# Patient Record
Sex: MALE | Race: WHITE | Employment: UNEMPLOYED | URBAN - METROPOLITAN AREA
[De-identification: names, ages, dates, MRNs, and addresses within clinical notes are randomized per-mention and may not be internally consistent; named-entity substitution may affect disease eponyms.]

---

## 2017-01-08 DIAGNOSIS — K58.0 IRRITABLE BOWEL SYNDROME WITH DIARRHEA: ICD-10-CM

## 2017-01-09 RX ORDER — DICYCLOMINE HYDROCHLORIDE 20 MG/1
TABLET ORAL
Qty: 60 TAB | Refills: 0 | Status: SHIPPED | OUTPATIENT
Start: 2017-01-09 | End: 2017-02-10 | Stop reason: SDUPTHER

## 2017-02-10 DIAGNOSIS — K58.0 IRRITABLE BOWEL SYNDROME WITH DIARRHEA: ICD-10-CM

## 2017-02-12 RX ORDER — DICYCLOMINE HYDROCHLORIDE 20 MG/1
TABLET ORAL
Qty: 60 TAB | Refills: 0 | Status: SHIPPED | OUTPATIENT
Start: 2017-02-12 | End: 2017-03-12 | Stop reason: SDUPTHER

## 2017-02-16 ENCOUNTER — OFFICE VISIT (OUTPATIENT)
Dept: FAMILY MEDICINE CLINIC | Age: 47
End: 2017-02-16

## 2017-02-16 VITALS
DIASTOLIC BLOOD PRESSURE: 82 MMHG | HEIGHT: 67 IN | OXYGEN SATURATION: 98 % | RESPIRATION RATE: 15 BRPM | SYSTOLIC BLOOD PRESSURE: 133 MMHG | WEIGHT: 189.4 LBS | HEART RATE: 80 BPM | TEMPERATURE: 98.4 F | BODY MASS INDEX: 29.73 KG/M2

## 2017-02-16 DIAGNOSIS — F41.9 ANXIETY: ICD-10-CM

## 2017-02-16 DIAGNOSIS — K42.9 UMBILICAL HERNIA WITHOUT OBSTRUCTION AND WITHOUT GANGRENE: ICD-10-CM

## 2017-02-16 DIAGNOSIS — L23.9 ALLERGIC CONTACT DERMATITIS, UNSPECIFIED TRIGGER: Primary | ICD-10-CM

## 2017-02-16 DIAGNOSIS — B07.9 VIRAL WARTS, UNSPECIFIED TYPE: ICD-10-CM

## 2017-02-16 DIAGNOSIS — K58.0 IRRITABLE BOWEL SYNDROME WITH DIARRHEA: ICD-10-CM

## 2017-02-16 RX ORDER — HYDROXYZINE HYDROCHLORIDE 10 MG/1
10 TABLET, FILM COATED ORAL
Qty: 60 TAB | Refills: 0 | Status: SHIPPED | OUTPATIENT
Start: 2017-02-16 | End: 2017-02-26

## 2017-02-16 RX ORDER — ESCITALOPRAM OXALATE 10 MG/1
10 TABLET ORAL DAILY
Qty: 30 TAB | Refills: 3 | Status: SHIPPED | OUTPATIENT
Start: 2017-02-16 | End: 2017-03-30 | Stop reason: SDUPTHER

## 2017-02-16 RX ORDER — TRIAMCINOLONE ACETONIDE 1 MG/G
CREAM TOPICAL 2 TIMES DAILY
Qty: 45 G | Refills: 3 | Status: SHIPPED | OUTPATIENT
Start: 2017-02-16

## 2017-02-16 NOTE — PROGRESS NOTES
George Holliday is a 40 y.o.  male and presents with     Chief Complaint   Patient presents with    Rash    Warts    Umbilical Hernia    Anxiety     Pt complains of rash on his lower abdomen on upper thigh region on both sides. Pt is not sure if it is allergies. He has applying some kind of lotion he is not sure if it is making it worse. Pt does have umbilical hernia that hurts. Pt also has lot of anxiety. Pt does have IBS and loose BM's for which he is on Viberzi. Pt has lesion on his rt palm that bleeds off and on and is thick  at [employer]      Past Medical History   Diagnosis Date    Asthma     Other ill-defined conditions(799.89)      Internal Hemorrhoids    Seizures ([address]      Past Surgical History   Procedure Laterality Date    Hx abdominal laparoscopy      Hx appendectomy      Colonoscopy N/A 7/21/2016     COLONOSCOPY performed by Adrian Tam MD at [facility] ENDOSCOPY     Current Outpatient Prescriptions   Medication Sig    hydrOXYzine HCl (ATARAX) 10 mg tablet Take 1 Tab by mouth three (3) times daily as needed for Itching for up to 10 days.  triamcinolone acetonide (KENALOG) 0.1 % topical cream Apply  to affected area two (2) times a day. use thin layer    escitalopram oxalate (LEXAPRO) 10 mg tablet Take 1 Tab by mouth daily.  dicyclomine (BENTYL) 20 mg tablet TAKE 1 TABLET BY MOUTH TWICE DAILY    eluxadoline (VIBERZI) 75 mg tablet Take 75 mg by mouth two (2) times daily (with meals).  albuterol (PROVENTIL HFA, VENTOLIN HFA, PROAIR HFA) 90 mcg/actuation inhaler Take 2 Puffs by inhalation every six (6) hours as needed for Wheezing.  meloxicam (MOBIC) 15 mg tablet Take 15 mg by mouth daily.  cyclobenzaprine (FLEXERIL) 5 mg tablet Take 5 mg by mouth as needed. No current facility-administered medications for this visit.       Health Maintenance   Topic Date Due    Pneumococcal 19-64 Medium Risk (1 of 1 - PPSV23) 09/30/1990    DTaP/Tdap/Td series (2 - Td) 05/03/2026    INFLUENZA AGE 9 TO ADULT  Addressed     Immunization History   Administered Date(s) Administered    Influenza Vaccine (Quad) PF 10/04/2016    Tdap 05/03/2016     No LMP for male patient. Allergies and Intolerances: Allergies   Allergen Reactions    Tizanidine Other (comments)     Felt like he couldn't swallow       Family History:   Family History   Problem Relation Age of Onset    Emphysema Mother     Depression Father     Sleep Apnea Father        Social History:   He  reports that he quit smoking about 7 years ago. His smoking use included Cigarettes. He has a 33.00 pack-year smoking history.  He uses smokeless tobacco.  He  reports that he drinks about 8.4 oz of alcohol per week             Review of Systems:   General: negative for - chills, fatigue, fever, weight change  Psych: positive for - anxiety,  ENT: negative for - headaches, hearing change, nasal congestion, oral lesions, sneezing or sore throat  Heme/ Lymph: negative for - bleeding problems, bruising, pallor or swollen lymph nodes  Endo: negative for - hot flashes, polydipsia/polyuria or temperature intolerance  Resp: negative for - cough, shortness of breath or wheezing  CV: negative for - chest pain, edema or palpitations  GI: negative for - abdominal pain, change in bowel habits, constipation, diarrhea or nausea/vomiting,pos for painfull umbilical hernia   : negative for - dysuria, hematuria, incontinence, pelvic pain or vulvar/vaginal symptoms  MSK: negative for - joint pain, joint swelling or muscle pain  Neuro: negative for - confusion, headaches, seizures or weakness  Derm: negative for - dry skin, hair changes, rash or skin lesion changes,pos for rash on lower abdomen and thighs          Physical:   Vitals:   Vitals:    02/16/17 0905   BP: 133/82   Pulse: 80   Resp: 15   Temp: 98.4 °F (36.9 °C)   TempSrc: Oral   SpO2: 98%   Weight: 189 lb 6.4 oz (85.9 kg)   Height: 5' 7\" (1.702 m)           Exam:   HEENT- atraumatic,normocephalic, awake, oriented, well nourished  Neck - supple,no enlarged lymph nodes, no JVD, no thyromegaly  Chest- CTA, no rhonchi, no crackles  Heart- rrr, no murmurs / gallop/rub  Abdomen- soft,BS+,NT, no hepatosplenomegaly, mild rash on lower abdomen, umbilical hernia , mild tenderness on direct pressure  Ext - no c/c/edema , mild erythematous  Rash on upper thigh both sides. ,small superifical ulcer on the paml of rt hand with induration at the edges  Neuro- no focal deficits. Power 5/5 all extremities  Skin - warm,dry, no obvious rashes. Review of Data:   LABS:   Lab Results   Component Value Date/Time    WBC 8.5 08/28/2016 11:35 AM    HGB 15.4 08/28/2016 11:35 AM    HCT 44.3 08/28/2016 11:35 AM    PLATELET 267 57/75/6932 11:35 AM     Lab Results   Component Value Date/Time    Sodium 134 08/30/2016 09:46 AM    Potassium 3.6 08/30/2016 09:46 AM    Chloride 93 08/30/2016 09:46 AM    CO2 25 08/30/2016 09:46 AM    Glucose 103 08/30/2016 09:46 AM    BUN 7 08/30/2016 09:46 AM    Creatinine 0.9 08/30/2016 09:46 AM     Lab Results   Component Value Date/Time    Cholesterol, total 199 07/11/2016 02:43 PM    HDL Cholesterol 41 07/11/2016 02:43 PM    LDL, calculated 119 07/11/2016 02:43 PM    Triglyceride 198 07/11/2016 02:43 PM     No results found for: GPT        Impression / Plan:        ICD-10-CM ICD-9-CM    1. Allergic contact dermatitis, unspecified trigger L23.9 692.9 hydrOXYzine HCl (ATARAX) 10 mg tablet      triamcinolone acetonide (KENALOG) 0.1 % topical cream   2. Viral warts, unspecified type B07.9 078.10 REFERRAL TO DERMATOLOGY   3. Umbilical hernia without obstruction and without gangrene K42.9 553.1 REFERRAL TO GENERAL SURGERY   4. Irritable bowel syndrome with diarrhea K58.0 564.1    5. Anxiety F41.9 300.00 escitalopram oxalate (LEXAPRO) 10 mg tablet     Avoid applying any lotion to the abdomen and thighs    Explained to patient risk benefits of the medications. Advised patient to stop meds if having any side effects. Pt verbalized understanding of the instructions. I have discussed the diagnosis with the patient and the intended plan as seen in the above orders. The patient has received an after-visit summary and questions were answered concerning future plans. I have discussed medication side effects and warnings with the patient as well. I have reviewed the plan of care with the patient, accepted their input and they are in agreement with the treatment goals. Reviewed plan of care. Patient has provided input and agrees with goals. Follow-up Disposition:  Return in about 6 weeks (around 3/30/2017).     Demario Rivas MD

## 2017-02-16 NOTE — PROGRESS NOTES
1. Have you been to the ER, urgent care clinic since your last visit? Hospitalized since your last visit? No    2. Have you seen or consulted any other health care providers outside of the [address] [place] [address] since your last visit? Include any pap smears or colon screening. Dr Orellana Class, neuro. Dr. Lynnette Vega- pain management    Patient has rash on left side of stomach and inside of legs for about a month.

## 2017-02-16 NOTE — MR AVS SNAPSHOT
Visit Information Date & Time Provider Department Dept. Phone Encounter #  
 2/16/2017  8:30 AM Skye Marquez[address] [phone] [id number] Follow-up Instructions Return in about 6 weeks (around 3/30/2017). Upcoming Health Maintenance Date Due Pneumococcal 19-64 Medium Risk (1 of 1 - PPSV23) 9/30/1990 DTaP/Tdap/Td series (2 - Td) 5/3/2026 Allergies as of 2/16/2017  Review Complete On: 2/16/2017 By: Cristiane Carlson LPN Severity Noted Reaction Type Reactions Tizanidine  05/03/2016    Other (comments) Felt like he couldn't swallow Current Immunizations  Never Reviewed Name Date Influenza Vaccine (Quad) PF 10/4/2016 10:50 AM  
 Tdap 5/3/2016 Not reviewed this visit You Were Diagnosed With   
  
 Codes Comments Allergic contact dermatitis, unspecified trigger    -  Primary ICD-10-CM: L23.9 ICD-9-CM: 692.9 Viral warts, unspecified type     ICD-10-CM: B07.9 ICD-9-CM: 078.10 Umbilical hernia without obstruction and without gangrene     ICD-10-CM: K42.9 ICD-9-CM: 553.1 Irritable bowel syndrome with diarrhea     ICD-10-CM: K58.0 ICD-9-CM: 843.9 Anxiety     ICD-10-CM: F41.9 ICD-9-CM: 300.00 Vitals BP Pulse Temp Resp Height(growth percentile) Weight(growth percentile) 133/82 (BP 1 Location: Left arm, BP Patient Position: Sitting) 80 98.4 °F (36.9 °C) (Oral) 15 5' 7\" (1.702 m) 189 lb 6.4 oz (85.9 kg) SpO2 BMI Smoking Status 98% 29.66 kg/m2 Former Smoker Vitals History BMI and BSA Data Body Mass Index Body Surface Area  
 29.66 kg/m 2 2.02 m 2 Preferred Pharmacy Pharmacy Name Phone [facility] [address] [phone] Your Updated Medication List  
  
   
This list is accurate as of: 2/16/17  9:50 AM.  Always use your most recent med list.  
  
  
  
  
 albuterol 90 mcg/actuation inhaler Commonly known as:  PROVENTIL HFA, VENTOLIN HFA, PROAIR HFA Take 2 Puffs by inhalation every six (6) hours as needed for Wheezing. cyclobenzaprine 5 mg tablet Commonly known as:  FLEXERIL Take 5 mg by mouth as needed. dicyclomine 20 mg tablet Commonly known as:  BENTYL TAKE 1 TABLET BY MOUTH TWICE DAILY  
  
 escitalopram oxalate 10 mg tablet Commonly known as:  Shan Barrs Take 1 Tab by mouth daily. hydrOXYzine HCl 10 mg tablet Commonly known as:  ATARAX Take 1 Tab by mouth three (3) times daily as needed for Itching for up to 10 days. meloxicam 15 mg tablet Commonly known as:  MOBIC Take 15 mg by mouth daily. triamcinolone acetonide 0.1 % topical cream  
Commonly known as:  KENALOG Apply  to affected area two (2) times a day. use thin layer VIBERZI 75 mg tablet Generic drug:  eluxadoline Take 75 mg by mouth two (2) times daily (with meals). Prescriptions Sent to Pharmacy Refills  
 hydrOXYzine HCl (ATARAX) 10 mg tablet 0 Sig: Take 1 Tab by mouth three (3) times daily as needed for Itching for up to 10 days. Class: Normal  
 Pharmacy: [address] Ph #: [phone] Route: Oral  
 triamcinolone acetonide (KENALOG) 0.1 % topical cream 3 Sig: Apply  to affected area two (2) times a day. use thin layer Class: Normal  
 Pharmacy: [address] Ph #: [phone] Route: Topical  
 escitalopram oxalate (LEXAPRO) 10 mg tablet 3 Sig: Take 1 Tab by mouth daily. Class: Normal  
 Pharmacy: [address] Ph #: [phone] Route: Oral  
  
We Performed the Following REFERRAL TO DERMATOLOGY [REF19 Custom] REFERRAL TO GENERAL SURGERY [REF27 Custom] Follow-up Instructions Return in about 6 weeks (around 3/30/2017). Referral Information Referral ID Referred By Referred To  
  
 [id number] Vivienne PARNELL Not Available Visits Status Start Date End Date 1 New Request 2/16/17 2/16/18 If your referral has a status of pending review or denied, additional information will be sent to support the outcome of this decision. Referral ID Referred By Referred To  
 [id number] [facility], [address] [place] [address] Ann-Marie Joyner MD  
   [id number] [place] [address] Phone: [phone] Fax: [phone] Visits Status Start Date End Date 1 New Request 2/16/17 2/16/18 If your referral has a status of pending review or denied, additional information will be sent to support the outcome of this decision. Introducing [facility]! Dear Parker Mei: Thank you for requesting a [employer] account. Our records indicate that you already have an active [employer] account. You can access your account anytime at [email or web address] Did you know that you can access your hospital and ER discharge instructions at any time in [employer]? You can also review all of your test results from your hospital stay or ER visit. Additional Information If you have questions, please visit the Frequently Asked Questions section of the [employer] website at [email or web address] Remember, [employer] is NOT to be used for urgent needs. For medical emergencies, dial 911. Now available from your iPhone and Android! Please provide this summary of care documentation to your next provider. Your primary care clinician is listed as Tanesha Cox. If you have any questions after today's visit, please call [phone].

## 2017-02-17 ENCOUNTER — TELEPHONE (OUTPATIENT)
Dept: FAMILY MEDICINE CLINIC | Age: 47
End: 2017-02-17

## 2017-02-17 NOTE — TELEPHONE ENCOUNTER
Called patient and advised them that interactions that could cause stomach bleed between the two medications should be reduced as long as they have been taken with food and that PCP would have brought it to patient's attention when prescribing lexapro. Advised patient that PCP was currently out of office and that he would be notified of his concern and followed up with by Monday if there are any concerns regarding these medications. Patient verbalized understanding.

## 2017-02-17 NOTE — TELEPHONE ENCOUNTER
Pt called stating that Dr. Makayla Matute recently prescribed him Lexapro. He said when he went to the pharmacy to pick it up the pharmacist told him that he has a higher chance of getting a stomach bleed when on both Lexapro and Meloxican. He would like to know if he should stop taking one.  Please assist.

## 2017-03-09 ENCOUNTER — OFFICE VISIT (OUTPATIENT)
Dept: SURGERY | Age: 47
End: 2017-03-09

## 2017-03-09 VITALS
DIASTOLIC BLOOD PRESSURE: 84 MMHG | WEIGHT: 189 LBS | RESPIRATION RATE: 20 BRPM | SYSTOLIC BLOOD PRESSURE: 136 MMHG | HEART RATE: 121 BPM | HEIGHT: 67 IN | BODY MASS INDEX: 29.66 KG/M2 | TEMPERATURE: 97.5 F

## 2017-03-09 DIAGNOSIS — K42.9 UMBILICAL HERNIA WITHOUT OBSTRUCTION AND WITHOUT GANGRENE: Primary | ICD-10-CM

## 2017-03-09 RX ORDER — ACETAMINOPHEN AND CODEINE PHOSPHATE 300; 60 MG/1; MG/1
1 TABLET ORAL
COMMUNITY

## 2017-03-09 NOTE — COMMUNICATION BODY
Hernia Evaluation      Subjective:     Rach Cobian is a 40 y.o. male with a history of umbilical bulging. He complains of increasing size and soreness with straining. The symptoms were first noticed 2 months ago. He has had no nausea and no vomiting. He denies chronic coughing, constipation but admits to recent 25 lb weight gain. Patient Active Problem List    Diagnosis Date Noted    Irritable bowel syndrome with diarrhea 02/16/2017    Seizures ([address] 10/04/2016    Bilateral back pain 10/04/2016    Elevated LFTs 10/04/2016    Splenomegaly 10/04/2016    Irritable bowel syndrome with constipation 10/04/2016     Past Medical History:   Diagnosis Date    Asthma     Other ill-defined conditions(799.89)     Internal Hemorrhoids    Seizures ([address]       Past Surgical History:   Procedure Laterality Date    COLONOSCOPY N/A 7/21/2016    COLONOSCOPY performed by Darren Dodson MD at [facility] ENDOSCOPY    HX ABDOMINAL LAPAROSCOPY      HX APPENDECTOMY      HX HERNIA REPAIR Left       Social History   Substance Use Topics    Smoking status: Former Smoker     Packs/day: 1.50     Years: 22.00     Types: Cigarettes     Quit date: 7/19/2009    Smokeless tobacco: Current User    Alcohol use 8.4 oz/week     14 Cans of beer per week      Family History   Problem Relation Age of Onset    Emphysema Mother     Depression Father     Sleep Apnea Father       Current Outpatient Prescriptions   Medication Sig    acetaminophen-codeine (TYLENOL #4) 300-60 mg per tablet Take 1 Tab by mouth every four (4) hours as needed for Pain.  triamcinolone acetonide (KENALOG) 0.1 % topical cream Apply  to affected area two (2) times a day. use thin layer    escitalopram oxalate (LEXAPRO) 10 mg tablet Take 1 Tab by mouth daily.  dicyclomine (BENTYL) 20 mg tablet TAKE 1 TABLET BY MOUTH TWICE DAILY    eluxadoline (VIBERZI) 75 mg tablet Take 75 mg by mouth two (2) times daily (with meals).     albuterol (PROVENTIL HFA, VENTOLIN HFA, PROAIR HFA) 90 mcg/actuation inhaler Take 2 Puffs by inhalation every six (6) hours as needed for Wheezing.  meloxicam (MOBIC) 15 mg tablet Take 15 mg by mouth daily.  cyclobenzaprine (FLEXERIL) 5 mg tablet Take 5 mg by mouth as needed. No current facility-administered medications for this visit.        Allergies   Allergen Reactions    Tizanidine Other (comments)     Felt like he couldn't swallow        Review of Systems:  Positive in BOLD    CONST: Fever, weight loss, fatigue or chills  GI: Nausea, vomiting, abdominal pain, change in bowel habits, hematochezia, melena, and GERD   INTEG: Dermatitis, abnormal moles  HEENT: Recent changes in vision, vertigo, epistaxis, dysphagia and hoarseness  CV: Chest pain, palpitations, HTN, edema and varicosities  RESP: Cough, shortness of breath, wheezing, hemoptysis, snoring and reactive airway disease  : Hematuria, dysuria, frequency, urgency, nocturia and stress urinary incontinence   MS: Weakness, joint pain and arthritis  ENDO: Diabetes, thyroid disease, polyuria, polydipsia, polyphagia, poor wound healing, heat intolerance, cold intolerance  LYMPH/HEME: Anemia, bruising and history of blood transfusions  NEURO: Dizziness, headache, fainting, seizures and stroke  PSYCH: Anxiety and depression    Objective:     Visit Vitals    /84 (BP 1 Location: Left arm, BP Patient Position: At rest)    Pulse (!) 121    Temp 97.5 °F (36.4 °C) (Oral)    Resp 20    Ht 5' 7\" (1.702 m)    Wt 85.7 kg (189 lb)    BMI 29.6 kg/m2       Physical Exam:      GENERAL: alert, cooperative, no distress, appears stated age  EYE:conjunctivae and sclerae normal, pupils equal, round, reactive to light, extraocular movements intact without nystagmus  THROAT & NECK: no erythema or exudates noted and neck supple and symmetrical; no palpable masses  LUNG: clear to auscultation bilaterally  HEART: Regular rate and rhythm  ABDOMEN:abdomen is soft without significant tenderness, masses, organomegaly or guarding, large manually reduceable umbilical hernia with 2 cm defect  EXTREMITIES:  extremities normal, atraumatic, no cyanosis or edema  SKIN: Normal.      Assessment:   Umbilical hernia  Patient has significant symptoms and wishes to proceed with surgical intervention. Plan:   I explained the indications for open umbilical hernia repair as well as the alternatives. I discussed the potential risks, benefits, and likely outcomes of this course of care, including but not limited to bleeding, wound infection, need for reoperation, injury to surrounding structures, hernia recurrence, mesh infection, failure to improve or even worsen his pain, anesthetic risks and imponderables to include death. The patient indicates understanding of the risks and wishes to proceed.       Signed By: Ann-Marie Joyner MD     March 9, 2017

## 2017-03-09 NOTE — PROGRESS NOTES
Hernia Evaluation      Subjective:     Joe Caballero is a 40 y.o. male with a history of umbilical bulging. He complains of increasing size and soreness with straining. The symptoms were first noticed 2 months ago. He has had no nausea and no vomiting. He denies chronic coughing, constipation but admits to recent 25 lb weight gain. Patient Active Problem List    Diagnosis Date Noted    Irritable bowel syndrome with diarrhea 02/16/2017    Seizures ([address] 10/04/2016    Bilateral back pain 10/04/2016    Elevated LFTs 10/04/2016    Splenomegaly 10/04/2016    Irritable bowel syndrome with constipation 10/04/2016     Past Medical History:   Diagnosis Date    Asthma     Other ill-defined conditions(799.89)     Internal Hemorrhoids    Seizures ([address]       Past Surgical History:   Procedure Laterality Date    COLONOSCOPY N/A 7/21/2016    COL[facility]COPY performed [facility] Walt [facility] MD at [facility] ENDOSCOPY    HX ABDOMINAL LAPAROSCOPY      HX APPENDECTOMY      HX HERNIA REPAIR Left       Social History   Substance Use Topics    Smoking status: Former Smoker     Packs/day: 1.50     Years: 22.00     Types: Cigarettes     Quit date: 7/19/2009    Smokeless tobacco: Current User    Alcohol use 8.4 oz/week     14 Cans of beer per week      Family History   Problem Relation Age of Onset    Emphysema Mother     Depression Father     Sleep Apnea Father       Current Outpatient Prescriptions   Medication Sig    acetaminophen-codeine (TYLENOL #4) 300-60 mg per tablet Take 1 Tab by mouth every four (4) hours as needed for Pain.  triamcinolone acetonide (KENALOG) 0.1 % topical cream Apply  to affected area two (2) times a day. use thin layer    escitalopram oxalate (LEXAPRO) 10 mg tablet Take 1 Tab by mouth daily.  dicyclomine (BENTYL) 20 mg tablet TAKE 1 TABLET BY MOUTH TWICE DAILY    eluxadoline (VIBERZI) 75 mg tablet Take 75 mg by mouth two (2) times daily (with meals).     albuterol (PROVENTIL HFA, VENTOLIN HFA, PROAIR HFA) 90 mcg/actuation inhaler Take 2 Puffs by inhalation every six (6) hours as needed for Wheezing.  meloxicam (MOBIC) 15 mg tablet Take 15 mg by mouth daily.  cyclobenzaprine (FLEXERIL) 5 mg tablet Take 5 mg by mouth as needed. No current facility-administered medications for this visit.        Allergies   Allergen Reactions    Tizanidine Other (comments)     Felt like he couldn't swallow        Review of Systems:  Positive in BOLD    CONST: Fever, weight loss, fatigue or chills  GI: Nausea, vomiting, abdominal pain, change in bowel habits, hematochezia, melena, and GERD   INTEG: Dermatitis, abnormal moles  HEENT: Recent changes in vision, vertigo, epistaxis, dysphagia and hoarseness  CV: Chest pain, palpitations, HTN, edema and varicosities  RESP: Cough, shortness of breath, wheezing, hemoptysis, snoring and reactive airway disease  : Hematuria, dysuria, frequency, urgency, nocturia and stress urinary incontinence   MS: Weakness, joint pain and arthritis  ENDO: Diabetes, thyroid disease, polyuria, polydipsia, polyphagia, poor wound healing, heat intolerance, cold intolerance  LYMPH/HEME: Anemia, bruising and history of blood transfusions  NEURO: Dizziness, headache, fainting, seizures and stroke  PSYCH: Anxiety and depression    Objective:     Visit Vitals    /84 (BP 1 Location: Left arm, BP Patient Position: At rest)    Pulse (!) 121    Temp 97.5 °F (36.4 °C) (Oral)    Resp 20    Ht 5' 7\" (1.702 m)    Wt 85.7 kg (189 lb)    BMI 29.6 kg/m2       Physical Exam:      GENERAL: alert, cooperative, no distress, appears stated age  EYE:conjunctivae and sclerae normal, pupils equal, round, reactive to light, extraocular movements intact without nystagmus  THROAT & NECK: no erythema or exudates noted and neck supple and symmetrical; no palpable masses  LUNG: clear to auscultation bilaterally  HEART: Regular rate and rhythm  ABDOMEN:abdomen is soft without significant tenderness, masses, organomegaly or guarding, large manually reduceable umbilical hernia with 2 cm defect  EXTREMITIES:  extremities normal, atraumatic, no cyanosis or edema  SKIN: Normal.      Assessment:   Umbilical hernia  Patient has significant symptoms and wishes to proceed with surgical intervention. Plan:   I explained the indications for open umbilical hernia repair as well as the alternatives. I discussed the potential risks, benefits, and likely outcomes of this course of care, including but not limited to bleeding, wound infection, need for reoperation, injury to surrounding structures, hernia recurrence, mesh infection, failure to improve or even worsen his pain, anesthetic risks and imponderables to include death. The patient indicates understanding of the risks and wishes to proceed.       Signed By: Ebony Nam MD     March 9, 2017

## 2017-03-09 NOTE — LETTER
3/9/2017 2:28 PM 
 
Patient:  Sandra Grace YOB: 1970 Date of Visit: 3/9/2017 Micaela Beal MD 
[id number] [place] [id number] VIA In Basket Dear Micaela Beal MD, Thank you for referring Mr. Skyla Durbin to [place] [address] for evaluation and treatment. Below are the relevant portions of my assessment and plan of care. Hernia Evaluation Subjective:  
 
Rach Cobian is a 40 y.o. male with a history of umbilical bulging. He complains of increasing size and soreness with straining. The symptoms were first noticed 2 months ago. He has had no nausea and no vomiting. He denies chronic coughing, constipation but admits to recent 25 lb weight gain. Patient Active Problem List  
 Diagnosis Date Noted  Irritable bowel syndrome with diarrhea 02/16/2017  Seizures ([address] 10/04/2016  Bilateral back pain 10/04/2016  Elevated LFTs 10/04/2016  Splenomegaly 10/04/2016  Irritable bowel syndrome with constipation 10/04/2016 Past Medical History:  
Diagnosis Date  Asthma  Other ill-defined conditions(799.89) Internal Hemorrhoids  Seizures ([address] Past Surgical History:  
Procedure Laterality Date  COLONOSCOPY N/A 7/21/2016 COLONOSCOPY performed by Darren Dodson MD at [facility] ENDOSCOPY  
 HX ABDOMINAL LAPAROSCOPY    
 HX APPENDECTOMY  HX HERNIA REPAIR Left Social History Substance Use Topics  Smoking status: Former Smoker Packs/day: 1.50 Years: 22.00 Types: Cigarettes Quit date: 7/19/2009  Smokeless tobacco: Current User  Alcohol use 8.4 oz/week 14 Cans of beer per week Family History Problem Relation Age of Onset  Emphysema Mother  Depression Father  Sleep Apnea Father Current Outpatient Prescriptions Medication Sig  
 acetaminophen-codeine (TYLENOL #4) 300-60 mg per tablet Take 1 Tab by mouth every four (4) hours as needed for Pain.  triamcinolone acetonide (KENALOG) 0.1 % topical cream Apply  to affected area two (2) times a day. use thin layer  escitalopram oxalate (LEXAPRO) 10 mg tablet Take 1 Tab by mouth daily.  dicyclomine (BENTYL) 20 mg tablet TAKE 1 TABLET BY MOUTH TWICE DAILY  eluxadoline (VIBERZI) 75 mg tablet Take 75 mg by mouth two (2) times daily (with meals).  albuterol (PROVENTIL HFA, VENTOLIN HFA, PROAIR HFA) 90 mcg/actuation inhaler Take 2 Puffs by inhalation every six (6) hours as needed for Wheezing.  meloxicam (MOBIC) 15 mg tablet Take 15 mg by mouth daily.  cyclobenzaprine (FLEXERIL) 5 mg tablet Take 5 mg by mouth as needed. No current facility-administered medications for this visit. Allergies Allergen Reactions  Tizanidine Other (comments) Felt like he couldn't swallow Review of Systems:  Positive in BOLD 
 
CONST: Fever, weight loss, fatigue or chills GI: Nausea, vomiting, abdominal pain, change in bowel habits, hematochezia, melena, and GERD INTEG: Dermatitis, abnormal moles HEENT: Recent changes in vision, vertigo, epistaxis, dysphagia and hoarseness CV: Chest pain, palpitations, HTN, edema and varicosities RESP: Cough, shortness of breath, wheezing, hemoptysis, snoring and reactive airway disease : Hematuria, dysuria, frequency, urgency, nocturia and stress urinary incontinence MS: Weakness, joint pain and arthritis ENDO: Diabetes, thyroid disease, polyuria, polydipsia, polyphagia, poor wound healing, heat intolerance, cold intolerance LYMPH/HEME: Anemia, bruising and history of blood transfusions NEURO: Dizziness, headache, fainting, seizures and stroke PSYCH: Anxiety and depression Objective:  
 
Visit Vitals  /84 (BP 1 Location: Left arm, BP Patient Position: At rest)  Pulse (!) 121  Temp 97.5 °F (36.4 °C) (Oral)  Resp 20  
 Ht 5' 7\" (1.702 m)  Wt 85.7 kg (189 lb)  BMI 29.6 kg/m2 Physical Exam:   
 
GENERAL: alert, cooperative, no distress, appears stated age EYE:conjunctivae and sclerae normal, pupils equal, round, reactive to light, extraocular movements intact without nystagmus THROAT & NECK: no erythema or exudates noted and neck supple and symmetrical; no palpable masses LUNG: clear to auscultation bilaterally HEART: Regular rate and rhythm ABDOMEN:abdomen is soft without significant tenderness, masses, organomegaly or guarding, large manually reduceable umbilical hernia with 2 cm defect EXTREMITIES:  extremities normal, atraumatic, no cyanosis or edema SKIN: Normal. 
 
 
Assessment:  
Umbilical hernia Patient has significant symptoms and wishes to proceed with surgical intervention. Plan: I explained the indications for open umbilical hernia repair as well as the alternatives. I discussed the potential risks, benefits, and likely outcomes of this course of care, including but not limited to bleeding, wound infection, need for reoperation, injury to surrounding structures, hernia recurrence, mesh infection, failure to improve or even worsen his pain, anesthetic risks and imponderables to include death. The patient indicates understanding of the risks and wishes to proceed. Signed By: Lucila Sánchez MD   
 March 9, 2017 Thank you very much for your referral of Mr. Florencio Henry. If you have questions, please do not hesitate to call me. I look forward to following Mr. Erickson along with you and will keep you updated as to his progress.   
 
 
 
 
Sincerely, 
 
 
Lucila Sánchez MD

## 2017-03-09 NOTE — PROGRESS NOTES
Rusty Garduno is a 40 y.o. male who presents today with   Chief Complaint   Patient presents with    Umbilical Hernia     Pt presents today c/o umbilical hernia present for that past few months. Pt is here for surgical intervention. 1. Have you been to the ER, urgent care clinic since your last visit? Hospitalized since your last visit? No    2. Have you seen or consulted any other health care providers outside of the [address] [place] [address] since your last visit? Include any pap smears or colon screening.  No

## 2017-03-10 ENCOUNTER — ANESTHESIA EVENT (OUTPATIENT)
Dept: SURGERY | Age: 47
End: 2017-03-10
Payer: COMMERCIAL

## 2017-03-12 DIAGNOSIS — K58.0 IRRITABLE BOWEL SYNDROME WITH DIARRHEA: ICD-10-CM

## 2017-03-12 RX ORDER — DICYCLOMINE HYDROCHLORIDE 20 MG/1
TABLET ORAL
Qty: 60 TAB | Refills: 0 | Status: SHIPPED | OUTPATIENT
Start: 2017-03-12 | End: 2017-03-30 | Stop reason: SDUPTHER

## 2017-03-13 ENCOUNTER — HOSPITAL ENCOUNTER (OUTPATIENT)
Age: 47
Setting detail: OUTPATIENT SURGERY
Discharge: HOME | End: 2017-03-13
Attending: SURGERY | Admitting: SURGERY
Payer: COMMERCIAL

## 2017-03-13 ENCOUNTER — SURGERY (OUTPATIENT)
Age: 47
End: 2017-03-13

## 2017-03-13 ENCOUNTER — ANESTHESIA (OUTPATIENT)
Dept: SURGERY | Age: 47
End: 2017-03-13
Payer: COMMERCIAL

## 2017-03-13 VITALS
SYSTOLIC BLOOD PRESSURE: 116 MMHG | DIASTOLIC BLOOD PRESSURE: 76 MMHG | TEMPERATURE: 97.1 F | RESPIRATION RATE: 16 BRPM | BODY MASS INDEX: 29.25 KG/M2 | HEIGHT: 67 IN | HEART RATE: 90 BPM | OXYGEN SATURATION: 95 % | WEIGHT: 186.38 LBS

## 2017-03-13 PROCEDURE — 77030011265 HC ELECTRD BLD HEX COVD -A: Performed by: SURGERY

## 2017-03-13 PROCEDURE — 76210000020 HC REC RM PH II FIRST 0.5 HR: Performed by: SURGERY

## 2017-03-13 PROCEDURE — 74011250636 HC RX REV CODE- 250/636

## 2017-03-13 PROCEDURE — 77030018836 HC SOL IRR NACL ICUM -A: Performed by: SURGERY

## 2017-03-13 PROCEDURE — 74011000250 HC RX REV CODE- 250

## 2017-03-13 PROCEDURE — 74011250637 HC RX REV CODE- 250/637: Performed by: NURSE ANESTHETIST, CERTIFIED REGISTERED

## 2017-03-13 PROCEDURE — 77030032490 HC SLV COMPR SCD KNE COVD -B: Performed by: SURGERY

## 2017-03-13 PROCEDURE — 77030008477 HC STYL SATN SLP COVD -A: Performed by: ANESTHESIOLOGY

## 2017-03-13 PROCEDURE — 77030002933 HC SUT MCRYL J&J -A: Performed by: SURGERY

## 2017-03-13 PROCEDURE — 76060000033 HC ANESTHESIA 1 TO 1.5 HR: Performed by: SURGERY

## 2017-03-13 PROCEDURE — 76010000161 HC OR TIME 1 TO 1.5 HR INTENSV-TIER 1: Performed by: SURGERY

## 2017-03-13 PROCEDURE — 74011250636 HC RX REV CODE- 250/636: Performed by: NURSE ANESTHETIST, CERTIFIED REGISTERED

## 2017-03-13 PROCEDURE — C1781 MESH (IMPLANTABLE): HCPCS | Performed by: SURGERY

## 2017-03-13 PROCEDURE — 77030011640 HC PAD GRND REM COVD -A: Performed by: SURGERY

## 2017-03-13 PROCEDURE — 77030031139 HC SUT VCRL2 J&J -A: Performed by: SURGERY

## 2017-03-13 PROCEDURE — 74011000250 HC RX REV CODE- 250: Performed by: SURGERY

## 2017-03-13 PROCEDURE — 74011250636 HC RX REV CODE- 250/636: Performed by: SURGERY

## 2017-03-13 PROCEDURE — 77030020408 HC DRSG TGDRM HCG 3M -A: Performed by: SURGERY

## 2017-03-13 PROCEDURE — 77030013079 HC BLNKT BAIR HGGR 3M -A: Performed by: ANESTHESIOLOGY

## 2017-03-13 PROCEDURE — 77030002946 HC SUT NRLN J&J -B: Performed by: SURGERY

## 2017-03-13 PROCEDURE — 74011000272 HC RX REV CODE- 272: Performed by: SURGERY

## 2017-03-13 PROCEDURE — 76210000006 HC OR PH I REC 0.5 TO 1 HR: Performed by: SURGERY

## 2017-03-13 PROCEDURE — 77030008683 HC TU ET CUF COVD -A: Performed by: ANESTHESIOLOGY

## 2017-03-13 PROCEDURE — 77030010507 HC ADH SKN DERMBND J&J -B: Performed by: SURGERY

## 2017-03-13 DEVICE — MESH VENTRALEX ST MED --: Type: IMPLANTABLE DEVICE | Site: ABDOMEN | Status: FUNCTIONAL

## 2017-03-13 RX ORDER — BUPIVACAINE HYDROCHLORIDE AND EPINEPHRINE 5; 5 MG/ML; UG/ML
INJECTION, SOLUTION EPIDURAL; INTRACAUDAL; PERINEURAL AS NEEDED
Status: DISCONTINUED | OUTPATIENT
Start: 2017-03-13 | End: 2017-03-13 | Stop reason: HOSPADM

## 2017-03-13 RX ORDER — MIDAZOLAM HYDROCHLORIDE 1 MG/ML
INJECTION, SOLUTION INTRAMUSCULAR; INTRAVENOUS AS NEEDED
Status: DISCONTINUED | OUTPATIENT
Start: 2017-03-13 | End: 2017-03-13 | Stop reason: HOSPADM

## 2017-03-13 RX ORDER — FENTANYL CITRATE 50 UG/ML
INJECTION, SOLUTION INTRAMUSCULAR; INTRAVENOUS AS NEEDED
Status: DISCONTINUED | OUTPATIENT
Start: 2017-03-13 | End: 2017-03-13 | Stop reason: HOSPADM

## 2017-03-13 RX ORDER — SODIUM CHLORIDE, SODIUM LACTATE, POTASSIUM CHLORIDE, CALCIUM CHLORIDE 600; 310; 30; 20 MG/100ML; MG/100ML; MG/100ML; MG/100ML
50 INJECTION, SOLUTION INTRAVENOUS CONTINUOUS
Status: DISCONTINUED | OUTPATIENT
Start: 2017-03-13 | End: 2017-03-13 | Stop reason: HOSPADM

## 2017-03-13 RX ORDER — ONDANSETRON 2 MG/ML
INJECTION INTRAMUSCULAR; INTRAVENOUS AS NEEDED
Status: DISCONTINUED | OUTPATIENT
Start: 2017-03-13 | End: 2017-03-13 | Stop reason: HOSPADM

## 2017-03-13 RX ORDER — HYDROMORPHONE HYDROCHLORIDE 1 MG/ML
0.5 INJECTION, SOLUTION INTRAMUSCULAR; INTRAVENOUS; SUBCUTANEOUS
Status: DISCONTINUED | OUTPATIENT
Start: 2017-03-13 | End: 2017-03-13 | Stop reason: HOSPADM

## 2017-03-13 RX ORDER — HYDROCODONE BITARTRATE AND ACETAMINOPHEN 5; 325 MG/1; MG/1
1 TABLET ORAL AS NEEDED
Status: DISCONTINUED | OUTPATIENT
Start: 2017-03-13 | End: 2017-03-13 | Stop reason: HOSPADM

## 2017-03-13 RX ORDER — SODIUM CHLORIDE 0.9 % (FLUSH) 0.9 %
5-10 SYRINGE (ML) INJECTION AS NEEDED
Status: DISCONTINUED | OUTPATIENT
Start: 2017-03-13 | End: 2017-03-13 | Stop reason: HOSPADM

## 2017-03-13 RX ORDER — OXYCODONE AND ACETAMINOPHEN 5; 325 MG/1; MG/1
1-2 TABLET ORAL
Qty: 30 TAB | Refills: 0 | Status: SHIPPED | OUTPATIENT
Start: 2017-03-13

## 2017-03-13 RX ORDER — PROPOFOL 10 MG/ML
INJECTION, EMULSION INTRAVENOUS AS NEEDED
Status: DISCONTINUED | OUTPATIENT
Start: 2017-03-13 | End: 2017-03-13 | Stop reason: HOSPADM

## 2017-03-13 RX ORDER — LIDOCAINE HYDROCHLORIDE 20 MG/ML
INJECTION, SOLUTION EPIDURAL; INFILTRATION; INTRACAUDAL; PERINEURAL AS NEEDED
Status: DISCONTINUED | OUTPATIENT
Start: 2017-03-13 | End: 2017-03-13 | Stop reason: HOSPADM

## 2017-03-13 RX ORDER — GLYCOPYRROLATE 0.2 MG/ML
INJECTION INTRAMUSCULAR; INTRAVENOUS AS NEEDED
Status: DISCONTINUED | OUTPATIENT
Start: 2017-03-13 | End: 2017-03-13 | Stop reason: HOSPADM

## 2017-03-13 RX ORDER — FENTANYL CITRATE 50 UG/ML
25 INJECTION, SOLUTION INTRAMUSCULAR; INTRAVENOUS AS NEEDED
Status: DISCONTINUED | OUTPATIENT
Start: 2017-03-13 | End: 2017-03-13 | Stop reason: HOSPADM

## 2017-03-13 RX ORDER — CEFAZOLIN SODIUM 2 G/50ML
2 SOLUTION INTRAVENOUS ONCE
Status: COMPLETED | OUTPATIENT
Start: 2017-03-13 | End: 2017-03-13

## 2017-03-13 RX ORDER — FAMOTIDINE 20 MG/1
20 TABLET, FILM COATED ORAL ONCE
Status: COMPLETED | OUTPATIENT
Start: 2017-03-13 | End: 2017-03-13

## 2017-03-13 RX ORDER — DEXAMETHASONE SODIUM PHOSPHATE 4 MG/ML
INJECTION, SOLUTION INTRA-ARTICULAR; INTRALESIONAL; INTRAMUSCULAR; INTRAVENOUS; SOFT TISSUE AS NEEDED
Status: DISCONTINUED | OUTPATIENT
Start: 2017-03-13 | End: 2017-03-13 | Stop reason: HOSPADM

## 2017-03-13 RX ORDER — ONDANSETRON 2 MG/ML
4 INJECTION INTRAMUSCULAR; INTRAVENOUS
Status: DISCONTINUED | OUTPATIENT
Start: 2017-03-13 | End: 2017-03-13 | Stop reason: HOSPADM

## 2017-03-13 RX ORDER — ROCURONIUM BROMIDE 10 MG/ML
INJECTION, SOLUTION INTRAVENOUS AS NEEDED
Status: DISCONTINUED | OUTPATIENT
Start: 2017-03-13 | End: 2017-03-13 | Stop reason: HOSPADM

## 2017-03-13 RX ORDER — NEOSTIGMINE METHYLSULFATE 5 MG/5 ML
SYRINGE (ML) INTRAVENOUS AS NEEDED
Status: DISCONTINUED | OUTPATIENT
Start: 2017-03-13 | End: 2017-03-13 | Stop reason: HOSPADM

## 2017-03-13 RX ORDER — SODIUM CHLORIDE, SODIUM LACTATE, POTASSIUM CHLORIDE, CALCIUM CHLORIDE 600; 310; 30; 20 MG/100ML; MG/100ML; MG/100ML; MG/100ML
25 INJECTION, SOLUTION INTRAVENOUS CONTINUOUS
Status: DISCONTINUED | OUTPATIENT
Start: 2017-03-13 | End: 2017-03-13 | Stop reason: HOSPADM

## 2017-03-13 RX ORDER — SODIUM CHLORIDE 0.9 % (FLUSH) 0.9 %
5-10 SYRINGE (ML) INJECTION EVERY 8 HOURS
Status: DISCONTINUED | OUTPATIENT
Start: 2017-03-13 | End: 2017-03-13 | Stop reason: HOSPADM

## 2017-03-13 RX ADMIN — CEFAZOLIN SODIUM 2 G: 2 SOLUTION INTRAVENOUS at 12:47

## 2017-03-13 RX ADMIN — PROPOFOL 200 MG: 10 INJECTION, EMULSION INTRAVENOUS at 12:49

## 2017-03-13 RX ADMIN — LIDOCAINE HYDROCHLORIDE 100 MG: 20 INJECTION, SOLUTION EPIDURAL; INFILTRATION; INTRACAUDAL; PERINEURAL at 12:49

## 2017-03-13 RX ADMIN — SODIUM CHLORIDE: 900 IRRIGANT IRRIGATION at 13:20

## 2017-03-13 RX ADMIN — MIDAZOLAM HYDROCHLORIDE 2 MG: 1 INJECTION, SOLUTION INTRAMUSCULAR; INTRAVENOUS at 12:45

## 2017-03-13 RX ADMIN — SODIUM CHLORIDE, SODIUM LACTATE, POTASSIUM CHLORIDE, AND CALCIUM CHLORIDE 25 ML/HR: 600; 310; 30; 20 INJECTION, SOLUTION INTRAVENOUS at 11:39

## 2017-03-13 RX ADMIN — GLYCOPYRROLATE 0.3 MG: 0.2 INJECTION INTRAMUSCULAR; INTRAVENOUS at 13:39

## 2017-03-13 RX ADMIN — ONDANSETRON 4 MG: 2 INJECTION INTRAMUSCULAR; INTRAVENOUS at 13:32

## 2017-03-13 RX ADMIN — FAMOTIDINE 20 MG: 20 TABLET ORAL at 11:38

## 2017-03-13 RX ADMIN — BUPIVACAINE HYDROCHLORIDE AND EPINEPHRINE BITARTRATE 8 ML: 5; .0091 INJECTION, SOLUTION EPIDURAL; INTRACAUDAL; PERINEURAL at 13:20

## 2017-03-13 RX ADMIN — ROCURONIUM BROMIDE 50 MG: 10 INJECTION, SOLUTION INTRAVENOUS at 12:49

## 2017-03-13 RX ADMIN — DEXAMETHASONE SODIUM PHOSPHATE 4 MG: 4 INJECTION, SOLUTION INTRA-ARTICULAR; INTRALESIONAL; INTRAMUSCULAR; INTRAVENOUS; SOFT TISSUE at 13:10

## 2017-03-13 RX ADMIN — Medication 4 MG: at 13:39

## 2017-03-13 RX ADMIN — FENTANYL CITRATE 100 MCG: 50 INJECTION, SOLUTION INTRAMUSCULAR; INTRAVENOUS at 12:47

## 2017-03-13 NOTE — ANESTHESIA PREPROCEDURE EVALUATION
Anesthetic History   No history of anesthetic complications            Review of Systems / Medical History  Patient summary reviewed and pertinent labs reviewed    Pulmonary            Asthma : well controlled       Neuro/Psych     seizures: well controlled         Cardiovascular  Within defined limits                Exercise tolerance: >4 METS     GI/Hepatic/Renal  Within defined limits              Endo/Other        Arthritis     Other Findings   Comments: Current Smoker? NO       Elective Surgery? Yes       Abstained from smoking 24 hours prior to anesthesia? N/A    Risk Factors for Postoperative nausea/vomiting:       History of postoperative nausea/vomiting? NO       Female? NO       Motion sickness? NO       Intended opioid administration for postoperative analgesia?   YES           Physical Exam    Airway  Mallampati: III  TM Distance: 4 - 6 cm  Neck ROM: normal range of motion   Mouth opening: Diminished (comment)     Cardiovascular    Rhythm: regular  Rate: normal         Dental    Dentition: Caps/crowns     Pulmonary  Breath sounds clear to auscultation               Abdominal  GI exam deferred       Other Findings            Anesthetic Plan    ASA: 2  Anesthesia type: general          Induction: Intravenous  Anesthetic plan and risks discussed with: Patient

## 2017-03-13 NOTE — DISCHARGE INSTRUCTIONS
Hernia Repair: What to Expect at [address] [place] [address] are likely to have pain for the next few days. You may also feel like you have the flu, and you may have a low fever and feel tired and nauseated. This is common. You should feel better after a few days and will probably feel much better in 7 days. For several weeks you may feel twinges or pulling in the hernia repair when you move. You may have some bruising on the scrotum and along the penis. This is normal. Men will need to wear a jockstrap or briefs, not boxers, for scrotal support for several days after a groin (inguinal) hernia repair. [employer] bicycle shorts may provide good support. This care sheet gives you a general idea about how long it will take for you to recover. But each person recovers at a different pace. Follow the steps below to get better as quickly as possible. How can you care for yourself at home? Activity  · Rest when you feel tired. Getting enough sleep will help you recover. · Try to walk each day. Start by walking a little more than you did the day before. Bit by bit, increase the amount you walk. Walking boosts blood flow and helps prevent pneumonia and constipation. · Avoid strenuous activities, such as biking, jogging, weight lifting, or aerobic exercise, until your doctor says it is okay. · Avoid lifting anything that would make you strain. This may include heavy grocery bags and milk containers, a heavy briefcase or backpack, cat litter or dog food bags, a vacuum , or a child. · You may drive when you are no longer taking pain medicine and can quickly move your foot from the gas pedal to the brake. You must also be able to sit comfortably for a long period of time, even if you do not plan to go far. You might get caught in traffic. · Most people are able to return to work within 1 to 2 weeks after surgery. · You may shower 24 to 48 hours after surgery, if your doctor okays it. Pat the cut (incision) dry. Do not take a bath for the first 2 weeks, or until your doctor tells you it is okay. · Your doctor will tell you when you can have sex again. Diet  · You can eat your normal diet. If your stomach is upset, try bland, low-fat foods like plain rice, broiled chicken, toast, and yogurt. · Drink plenty of fluids (unless your doctor tells you not to). · You may notice that your bowel movements are not regular right after your surgery. This is common. Avoid constipation and straining with bowel movements. You may want to take a fiber supplement every day. If you have not had a bowel movement after a couple of days, ask your doctor about taking a mild laxative. Medicines  · Your doctor will tell you if and when you can restart your medicines. He or she will also give you instructions about taking any new medicines. · If you take blood thinners, such as warfarin (Coumadin), clopidogrel (Plavix), or aspirin, be sure to talk to your doctor. He or she will tell you if and when to start taking those medicines again. Make sure that you understand exactly what your doctor wants you to do. · Be safe with medicines. Take pain medicines exactly as directed. ¨ If the doctor gave you a prescription medicine for pain, take it as prescribed. ¨ If you are not taking a prescription pain medicine, take an over-the-counter medicine such as acetaminophen (Tylenol), ibuprofen (Advil, Motrin), or naproxen (Aleve). Read and follow all instructions on the label. ¨ Do not take two or more pain medicines at the same time unless the doctor told you to. Many pain medicines have acetaminophen, which is Tylenol. Too much acetaminophen (Tylenol) can be harmful. · If your doctor prescribed antibiotics, take them as directed. Do not stop taking them just because you feel better. You need to take the full course of antibiotics.   · If you think your pain medicine is making you sick to your stomach:  ¨ Take your medicine after meals (unless your doctor has told you not to). ¨ Ask your doctor for a different pain medicine. Incision care  · If you have strips of tape on the cut (incision) the doctor made, leave the tape on for a week or until it falls off. · If you have staples closing the cut, you will need to visit your doctor in 1 to 2 weeks to have them removed. · Wash the area daily with warm, soapy water and pat it dry. Follow-up care is a key part of your treatment and safety. Be sure to make and go to all appointments, and call your doctor if you are having problems. It's also a good idea to know your test results and keep a list of the medicines you take. When should you call for help? Call 911 anytime you think you may need emergency care. For example, call if:  · You passed out (lost consciousness). · You have sudden chest pain and shortness of breath, or you cough up blood. · You have severe pain in your belly. Call your doctor now or seek immediate medical care if:  · You are sick to your stomach and cannot keep fluids down. · You have signs of a blood clot, such as:  ¨ Pain in your calf, back of the knee, thigh, or groin. ¨ Redness and swelling in your leg or groin. · You have trouble passing urine or stool, especially if you have mild pain or swelling in your lower belly. · Bright red blood has soaked through the bandage over your incision. Watch closely for any changes in your health, and be sure to contact your doctor if:  · Your swelling is getting worse. · Your swelling is not going down. Where can you learn more? Go to http://lillie-jordana.info/. Enter Y893 in the search box to learn more about \"Hernia Repair: What to Expect at Home. \"  Current as of: August 9, 2016  Content Version: 11.1  © 2751-5233 [employer]. Care instructions adapted under license by [employer] (which disclaims liability or warranty for this information).  If you have questions about a medical condition or this instruction, always ask your healthcare professional. [place] [address] any warranty or liability for your use of this information. DISCHARGE SUMMARY from Nurse    The following personal items are in your possession at time of discharge:    Dental Appliances: None  Visual Aid: None     Home Medications: None  Jewelry: None  Clothing: Undergarments, Shirt, Pants, Footwear                PATIENT INSTRUCTIONS:    After general anesthesia or intravenous sedation, for 24 hours or while taking prescription Narcotics:  · Limit your activities  · Do not drive and operate hazardous machinery  · Do not make important personal or business decisions  · Do  not drink alcoholic beverages  · If you have not urinated within 8 hours after discharge, please contact your surgeon on call. Report the following to your surgeon:  · Excessive pain, swelling, redness or odor of or around the surgical area  · Temperature over 100.5  · Nausea and vomiting lasting longer than 4 hours or if unable to take medications  · Any signs of decreased circulation or nerve impairment to extremity: change in color, persistent  numbness, tingling, coldness or increase pain  · Any questions        What to do at Home:  These are general instructions for a healthy lifestyle:    No smoking/ No tobacco products/ Avoid exposure to second hand smoke    Surgeon General's Warning:  Quitting smoking now greatly reduces serious risk to your health. Obesity, smoking, and sedentary lifestyle greatly increases your risk for illness    A healthy diet, regular physical exercise & weight monitoring are important for maintaining a healthy lifestyle    You may be retaining fluid if you have a history of heart failure or if you experience any of the following symptoms:  Weight gain of 3 pounds or more overnight or 5 pounds in a week, increased swelling in our hands or feet or shortness of breath while lying flat in bed.   Please call your doctor as soon as you notice any of these symptoms; do not wait until your next office visit. Recognize signs and symptoms of STROKE:    F-face looks uneven    A-arms unable to move or move unevenly    S-speech slurred or non-existent    T-time-call 911 as soon as signs and symptoms begin-DO NOT go       Back to bed or wait to see if you get better-TIME IS BRAIN. Warning Signs of HEART ATTACK     Call 911 if you have these symptoms:   Chest discomfort. Most heart attacks involve discomfort in the center of the chest that lasts more than a few minutes, or that goes away and comes back. It can feel like uncomfortable pressure, squeezing, fullness, or pain.  Discomfort in other areas of the upper body. Symptoms can include pain or discomfort in one or both arms, the back, neck, jaw, or stomach.  Shortness of breath with or without chest discomfort.  Other signs may include breaking out in a cold sweat, nausea, or lightheadedness. Don't wait more than five minutes to call 911 - MINUTES MATTER! Fast action can save your life. Calling 911 is almost always the fastest way to get lifesaving treatment. Emergency Medical Services staff can begin treatment when they arrive -- up to an hour sooner than if someone gets to the hospital by car. The discharge information has been reviewed with the patient. The patient verbalized understanding. Discharge medications reviewed with the patient and appropriate educational materials and side effects teaching were provided. Patient armband removed and given to patient to take home.   Patient was informed of the privacy risks if armband lost or stolen

## 2017-03-13 NOTE — ANESTHESIA POSTPROCEDURE EVALUATION
Post-Anesthesia Evaluation and Assessment    Patient: Helena Bailey MRN: [id number]  SSN: [id number]    YOB: 1970  Age: 40 y.o. Sex: male      Data from PACU flowsheet    Cardiovascular Function/Vital Signs  Visit Vitals    /67 (BP 1 Location: Left arm, BP Patient Position: At rest;Head of bed elevated (Comment degrees))    Pulse 82    Temp 36.2 °C (97.1 °F)    Resp 18    Ht 5' 7\" (1.702 m)    Wt 84.5 kg (186 lb 6 oz)    SpO2 97%    BMI 29.19 kg/m2       Patient is status post anesthesia    Nausea/Vomiting: controlled    Postoperative hydration reviewed and adequate. Pain:  Managed    Neurological Status: At baseline    Mental Status and Level of Consciousness: Alert and oriented     Pulmonary Status:   Adequate oxygenation and airway patent    Complications related to anesthesia: None    Post-anesthesia assessment completed.  No concerns    Signed By: Aurea Holly CRNA     March 13, 2017

## 2017-03-13 NOTE — H&P (VIEW-ONLY)
Hernia Evaluation      Subjective:     An Solis is a 40 y.o. male with a history of umbilical bulging. He complains of increasing size and soreness with straining. The symptoms were first noticed 2 months ago. He has had no nausea and no vomiting. He denies chronic coughing, constipation but admits to recent 25 lb weight gain. Patient Active Problem List    Diagnosis Date Noted    Irritable bowel syndrome with diarrhea 02/16/2017    Seizures ([address] 10/04/2016    Bilateral back pain 10/04/2016    Elevated LFTs 10/04/2016    Splenomegaly 10/04/2016    Irritable bowel syndrome with constipation 10/04/2016     Past Medical History:   Diagnosis Date    Asthma     Other ill-defined conditions(799.89)     Internal Hemorrhoids    Seizures ([address]       Past Surgical History:   Procedure Laterality Date    COLONOSCOPY N/A 7/21/2016    COLON[facility]COPY performed by Jerad Galaviz MD at [facility] ENDOSCOPY    HX ABDOMINAL LAPAROSCOPY      HX APPENDECTOMY      HX HERNIA REPAIR Left       Social History   Substance Use Topics    Smoking status: Former Smoker     Packs/day: 1.50     Years: 22.00     Types: Cigarettes     Quit date: 7/19/2009    Smokeless tobacco: Current User    Alcohol use 8.4 oz/week     14 Cans of beer per week      Family History   Problem Relation Age of Onset    Emphysema Mother     Depression Father     Sleep Apnea Father       Current Outpatient Prescriptions   Medication Sig    acetaminophen-codeine (TYLENOL #4) 300-60 mg per tablet Take 1 Tab by mouth every four (4) hours as needed for Pain.  triamcinolone acetonide (KENALOG) 0.1 % topical cream Apply  to affected area two (2) times a day. use thin layer    escitalopram oxalate (LEXAPRO) 10 mg tablet Take 1 Tab by mouth daily.  dicyclomine (BENTYL) 20 mg tablet TAKE 1 TABLET BY MOUTH TWICE DAILY    eluxadoline (VIBERZI) 75 mg tablet Take 75 mg by mouth two (2) times daily (with meals).     albuterol (PROVENTIL HFA, VENTOLIN HFA, PROAIR HFA) 90 mcg/actuation inhaler Take 2 Puffs by inhalation every six (6) hours as needed for Wheezing.  meloxicam (MOBIC) 15 mg tablet Take 15 mg by mouth daily.  cyclobenzaprine (FLEXERIL) 5 mg tablet Take 5 mg by mouth as needed. No current facility-administered medications for this visit.        Allergies   Allergen Reactions    Tizanidine Other (comments)     Felt like he couldn't swallow        Review of Systems:  Positive in BOLD    CONST: Fever, weight loss, fatigue or chills  GI: Nausea, vomiting, abdominal pain, change in bowel habits, hematochezia, melena, and GERD   INTEG: Dermatitis, abnormal moles  HEENT: Recent changes in vision, vertigo, epistaxis, dysphagia and hoarseness  CV: Chest pain, palpitations, HTN, edema and varicosities  RESP: Cough, shortness of breath, wheezing, hemoptysis, snoring and reactive airway disease  : Hematuria, dysuria, frequency, urgency, nocturia and stress urinary incontinence   MS: Weakness, joint pain and arthritis  ENDO: Diabetes, thyroid disease, polyuria, polydipsia, polyphagia, poor wound healing, heat intolerance, cold intolerance  LYMPH/HEME: Anemia, bruising and history of blood transfusions  NEURO: Dizziness, headache, fainting, seizures and stroke  PSYCH: Anxiety and depression    Objective:     Visit Vitals    /84 (BP 1 Location: Left arm, BP Patient Position: At rest)    Pulse (!) 121    Temp 97.5 °F (36.4 °C) (Oral)    Resp 20    Ht 5' 7\" (1.702 m)    Wt 85.7 kg (189 lb)    BMI 29.6 kg/m2       Physical Exam:      GENERAL: alert, cooperative, no distress, appears stated age  EYE:conjunctivae and sclerae normal, pupils equal, round, reactive to light, extraocular movements intact without nystagmus  THROAT & NECK: no erythema or exudates noted and neck supple and symmetrical; no palpable masses  LUNG: clear to auscultation bilaterally  HEART: Regular rate and rhythm  ABDOMEN:abdomen is soft without significant tenderness, masses, organomegaly or guarding, large manually reduceable umbilical hernia with 2 cm defect  EXTREMITIES:  extremities normal, atraumatic, no cyanosis or edema  SKIN: Normal.      Assessment:   Umbilical hernia  Patient has significant symptoms and wishes to proceed with surgical intervention. Plan:   I explained the indications for open umbilical hernia repair as well as the alternatives. I discussed the potential risks, benefits, and likely outcomes of this course of care, including but not limited to bleeding, wound infection, need for reoperation, injury to surrounding structures, hernia recurrence, mesh infection, failure to improve or even worsen his pain, anesthetic risks and imponderables to include death. The patient indicates understanding of the risks and wishes to proceed.       Signed By: Colten Chapin MD     March 9, 2017

## 2017-03-13 NOTE — OP NOTES
PREOPERATIVE DIAGNOSIS: Umbilical hernia. POSTOPERATIVE DIAGNOSIS: Umbilical hernia. PROCEDURE: Umbilical hernia repair  with Ventralex ST mesh. SURGEON: Venus Samayoa. Pietro Kelley MD, FACS    ASSISTANT: Duane Dana, MPA-C    ANESTHESIA: LMA    ESTIMATED BLOOD LOSS: 5 ml    COMPLICATIONS: None    FINDINGS: 2 cm defect    IMPLANT:   Implant Name Type Inv. Item Serial No.  Lot No. LRB No. Used Action   MESH VENTRALEX ST MED --  - [id number]   [address] [address] MED --  [id number] [address] [id number] N/A 1 Implanted       DESCRIPTION OF PROCEDURE:     The patient was prepped and draped in a standard sterile fashion after being placed under anesthetic. The umbilical region was anesthetized in the subcutaneous tissues with approximately 12 ml 0.5% Marcaine with epinephrine. A curvilinear incision was made on the right margin of the umbilicus through a previous scar. Dissection was  carried through the skin and subcutaneous tissues with scalpel, Bovie cautery and scissor to the level of the midline fascia. The umbilical hernia and umbilicus was dissected from the surrounding tissues circumferentially. The hernia sac was dissected off the fascia sharply and incarcerated contents were safely dissected free  and reduced into the abdominal cavity . The fascial margins were cleaned circumferentially including the underside of the abdominal wall at the fascial defect. At this point, a medium Ventralex ST mesh was brought onto the field and placed through the fascial defect and deployed directly beneath the defect. Careful inspection confirmed there were no abdominal contents trapped between the mesh and the wall. U sutures of 2-0 Nurolon were placed superior and inferior through the mesh tails. Additional U sutures of 2-0 Nurolon were then placed left and right lateral  through the mesh and the fascia fully fixing the mesh at the defect, completing the repair. The wound was examined.  No evidence of ongoing bleeding was identified. The wound was irrigated and all irrigation was suctioned clean. The umbilical skin was tacked to the fascia just inferior to the defect margin with an single 3-0 Vicryl suture. The skin was then closed in two layers with 3-0 Vicryl deep dermal and running 4-0 Monocryl subcuticular sutures. A sterile dressing including cotton packing in the umbilicus was placed over the wound. The patient was awakened and taken to the PACU having tolerated the procedure well.

## 2017-03-13 NOTE — IP AVS SNAPSHOT
[address] [place] [address] Nai Ellissukh Sweeney 
[phone] Patient: Katie Darnell MRN: [id number] You are allergic to the following Allergen Reactions Tizanidine Other (comments) Felt like he couldn't swallow Tramadol Seizures Recent Documentation Height Weight BMI Smoking Status 1.702 m 84.5 kg 29.19 kg/m2 Former Smoker Emergency Contacts Name Discharge Info Relation Home Work Mobile Yari Erickson DISCHARGE CAREGIVER [3] Spouse [3]   [phone] About your hospitalization You were admitted on:  March 13, 2017 You last received care in the:  [facility] PHASE 2 RECOVERY You were discharged on:  March 13, 2017 Unit phone number:  [phone] Why you were hospitalized Your primary diagnosis was:  Not on File Providers Seen During Your Hospitalizations Provider Role Specialty Primary office phone Pat Enriquez MD Attending Provider General Surgery [phone] Your Primary Care Physician (PCP) Primary Care Physician Office Phone Office Fax [facility] [phone] Follow-up Information Follow up With Details Comments Contact Info Kinsey Cramer MD   [id number] [place] [address] [address] [facility] [phone] 
[phone] Your Appointments Thursday March 30, 2017  9:00 AM EDT Follow Up with Kinsey Cramer MD  
[facility] [id number] [place] [address] [address] [facility] [address] [id number]  
[phone] Thursday April 06, 2017  3:00 PM EDT  
POST OP with Pat Enriquez MD  
[address] [place] [address] [address] [facility] [phone]  
[phone] Current Discharge Medication List  
  
START taking these medications Dose & Instructions Dispensing Information Comments Morning Noon Evening Bedtime  
 oxyCODONE-acetaminophen 5-325 mg per tablet Commonly known as:  PERCOCET Your last dose was: Your next dose is: Other:  _________ Dose:  1-2 Tab Take 1-2 Tabs by mouth every four (4) hours as needed for Pain. Max Daily Amount: 12 Tabs. Quantity:  30 Tab Refills:  0 CONTINUE these medications which have NOT CHANGED Dose & Instructions Dispensing Information Comments Morning Noon Evening Bedtime  
 acetaminophen-codeine 300-60 mg per tablet Commonly known as:  TYLENOL #4 Your last dose was: Your next dose is: Other:  _________ Dose:  1 Tab Take 1 Tab by mouth every four (4) hours as needed for Pain. Refills:  0  
     
   
   
   
  
 albuterol 90 mcg/actuation inhaler Commonly known as:  PROVENTIL HFA, VENTOLIN HFA, PROAIR HFA Your last dose was: Your next dose is: Other:  _________ Dose:  2 Puff Take 2 Puffs by inhalation every six (6) hours as needed for Wheezing. Quantity:  1 Inhaler Refills:  3  
     
   
   
   
  
 cyclobenzaprine 5 mg tablet Commonly known as:  FLEXERIL Your last dose was: Your next dose is: Other:  _________ Dose:  5 mg Take 5 mg by mouth as needed. Refills:  0  
     
   
   
   
  
 dicyclomine 20 mg tablet Commonly known as:  BENTYL Your last dose was: Your next dose is: Other:  _________ TAKE 1 TABLET BY MOUTH TWICE DAILY Quantity:  60 Tab Refills:  0  
     
   
   
   
  
 escitalopram oxalate 10 mg tablet Commonly known as:  Dinesh Quinones Your last dose was: Your next dose is: Other:  _________ Dose:  10 mg Take 1 Tab by mouth daily. Quantity:  30 Tab Refills:  3  
     
   
   
   
  
 meloxicam 15 mg tablet Commonly known as:  MOBIC Your last dose was: Your next dose is: Other:  _________ Dose:  15 mg Take 15 mg by mouth daily. Refills:  0  
     
   
   
   
  
 triamcinolone acetonide 0.1 % topical cream  
Commonly known as:  KENALOG Your last dose was: Your next dose is: Other:  _________ Apply  to affected area two (2) times a day. use thin layer Quantity:  45 g Refills:  3 VIBERZI 75 mg tablet Generic drug:  eluxadoline Your last dose was: Your next dose is: Other:  _________ Dose:  75 mg Take 75 mg by mouth two (2) times daily (with meals). Refills:  0 Where to Get Your Medications Information on where to get these meds will be given to you by the nurse or doctor. ! Ask your nurse or doctor about these medications  
  oxyCODONE-acetaminophen 5-325 mg per tablet Discharge Instructions Hernia Repair: What to Expect at Home Your Recovery You are likely to have pain for the next few days. You may also feel like you have the flu, and you may have a low fever and feel tired and nauseated. This is common. You should feel better after a few days and will probably feel much better in 7 days. For several weeks you may feel twinges or pulling in the hernia repair when you move. You may have some bruising on the scrotum and along the penis. This is normal. Men will need to wear a jockstrap or briefs, not boxers, for scrotal support for several days after a groin (inguinal) hernia repair. [employer] bicycle shorts may provide good support. This care sheet gives you a general idea about how long it will take for you to recover. But each person recovers at a different pace. Follow the steps below to get better as quickly as possible. How can you care for yourself at home? Activity · Rest when you feel tired. Getting enough sleep will help you recover. · Try to walk each day. Start by walking a little more than you did the day before. Bit by bit, increase the amount you walk. Walking boosts blood flow and helps prevent pneumonia and constipation. · Avoid strenuous activities, such as biking, jogging, weight lifting, or aerobic exercise, until your doctor says it is okay. · Avoid lifting anything that would make you strain. This may include heavy grocery bags and milk containers, a heavy briefcase or backpack, cat litter or dog food bags, a vacuum , or a child. · You may drive when you are no longer taking pain medicine and can quickly move your foot from the gas pedal to the brake. You must also be able to sit comfortably for a long period of time, even if you do not plan to go far. You might get caught in traffic. · Most people are able to return to work within 1 to 2 weeks after surgery. · You may shower 24 to 48 hours after surgery, if your doctor okays it. Pat the cut (incision) dry. Do not take a bath for the first 2 weeks, or until your doctor tells you it is okay. · Your doctor will tell you when you can have sex again. Diet · You can eat your normal diet. If your stomach is upset, try bland, low-fat foods like plain rice, broiled chicken, toast, and yogurt. · Drink plenty of fluids (unless your doctor tells you not to). · You may notice that your bowel movements are not regular right after your surgery. This is common. Avoid constipation and straining with bowel movements. You may want to take a fiber supplement every day. If you have not had a bowel movement after a couple of days, ask your doctor about taking a mild laxative. Medicines · Your doctor will tell you if and when you can restart your medicines. He or she will also give you instructions about taking any new medicines. · If you take blood thinners, such as warfarin (Coumadin), clopidogrel (Plavix), or aspirin, be sure to talk to your doctor.  He or she will tell you if and when to start taking those medicines again. Make sure that you understand exactly what your doctor wants you to do. · Be safe with medicines. Take pain medicines exactly as directed. ¨ If the doctor gave you a prescription medicine for pain, take it as prescribed. ¨ If you are not taking a prescription pain medicine, take an over-the-counter medicine such as acetaminophen (Tylenol), ibuprofen (Advil, Motrin), or naproxen (Aleve). Read and follow all instructions on the label. ¨ Do not take two or more pain medicines at the same time unless the doctor told you to. Many pain medicines have acetaminophen, which is Tylenol. Too much acetaminophen (Tylenol) can be harmful. · If your doctor prescribed antibiotics, take them as directed. Do not stop taking them just because you feel better. You need to take the full course of antibiotics. · If you think your pain medicine is making you sick to your stomach: 
¨ Take your medicine after meals (unless your doctor has told you not to). ¨ Ask your doctor for a different pain medicine. Incision care · If you have strips of tape on the cut (incision) the doctor made, leave the tape on for a week or until it falls off. · If you have staples closing the cut, you will need to visit your doctor in 1 to 2 weeks to have them removed. · Wash the area daily with warm, soapy water and pat it dry. Follow-up care is a key part of your treatment and safety. Be sure to make and go to all appointments, and call your doctor if you are having problems. It's also a good idea to know your test results and keep a list of the medicines you take. When should you call for help? Call 911 anytime you think you may need emergency care. For example, call if: 
· You passed out (lost consciousness). · You have sudden chest pain and shortness of breath, or you cough up blood. · You have severe pain in your belly. Call your doctor now or seek immediate medical care if: · You are sick to your stomach and cannot keep fluids down. · You have signs of a blood clot, such as: 
¨ Pain in your calf, back of the knee, thigh, or groin. ¨ Redness and swelling in your leg or groin. · You have trouble passing urine or stool, especially if you have mild pain or swelling in your lower belly. · Bright red blood has soaked through the bandage over your incision. Watch closely for any changes in your health, and be sure to contact your doctor if: 
· Your swelling is getting worse. · Your swelling is not going down. Where can you learn more? Go to http://lillie-jordana.info/. Enter B337 in the search box to learn more about \"Hernia Repair: What to Expect at Home. \" Current as of: August 9, 2016 Content Version: 11.1 © 2705-2612 [employer]. Care instructions adapted under license by [employer] (which disclaims liability or warranty for this information). If you have questions about a medical condition or this instruction, always ask your healthcare professional. [address] [place] [address] any warranty or liability for your use of this information. DISCHARGE SUMMARY from Nurse The following personal items are in your possession at time of discharge: 
 
Dental Appliances: None Visual Aid: None Home Medications: None Jewelry: None Clothing: Undergarments, Shirt, Pants, Footwear PATIENT INSTRUCTIONS: 
 
After general anesthesia or intravenous sedation, for 24 hours or while taking prescription Narcotics: · Limit your activities · Do not drive and operate hazardous machinery · Do not make important personal or business decisions · Do  not drink alcoholic beverages · If you have not urinated within 8 hours after discharge, please contact your surgeon on call. Report the following to your surgeon: 
· Excessive pain, swelling, redness or odor of or around the surgical area · Temperature over 100.5 · Nausea and vomiting lasting longer than 4 hours or if unable to take medications · Any signs of decreased circulation or nerve impairment to extremity: change in color, persistent  numbness, tingling, coldness or increase pain · Any questions What to do at Home: These are general instructions for a healthy lifestyle: No smoking/ No tobacco products/ Avoid exposure to second hand smoke Surgeon General's Warning:  Quitting smoking now greatly reduces serious risk to your health. Obesity, smoking, and sedentary lifestyle greatly increases your risk for illness A healthy diet, regular physical exercise & weight monitoring are important for maintaining a healthy lifestyle You may be retaining fluid if you have a history of heart failure or if you experience any of the following symptoms:  Weight gain of 3 pounds or more overnight or 5 pounds in a week, increased swelling in our hands or feet or shortness of breath while lying flat in bed. Please call your doctor as soon as you notice any of these symptoms; do not wait until your next office visit. Recognize signs and symptoms of STROKE: 
 
F-face looks uneven A-arms unable to move or move unevenly S-speech slurred or non-existent T-time-call 911 as soon as signs and symptoms begin-DO NOT go Back to bed or wait to see if you get better-TIME IS BRAIN. Warning Signs of HEART ATTACK Call 911 if you have these symptoms: 
? Chest discomfort. Most heart attacks involve discomfort in the center of the chest that lasts more than a few minutes, or that goes away and comes back. It can feel like uncomfortable pressure, squeezing, fullness, or pain. ? Discomfort in other areas of the upper body. Symptoms can include pain or discomfort in one or both arms, the back, neck, jaw, or stomach. ? Shortness of breath with or without chest discomfort. ? Other signs may include breaking out in a cold sweat, nausea, or lightheadedness. Don't wait more than five minutes to call [address]! Fast action can save your life. Calling 911 is almost always the fastest way to get lifesaving treatment. Emergency Medical Services staff can begin treatment when they arrive  up to an hour sooner than if someone gets to the hospital by car. The discharge information has been reviewed with the patient. The patient verbalized understanding. Discharge medications reviewed with the patient and appropriate educational materials and side effects teaching were provided. Patient armband removed and given to patient to take home. Patient was informed of the privacy risks if armband lost or stolen Discharge Orders None Introducing [facility]! Dear Yahaira Rivera: Thank you for requesting a [employer] account. Our records indicate that you already have an active En[employer] account. You can access your account anytime at [email or web address] Did you know that you can access your hospital and ER discharge instructions at any time in [employer]use? You can also review all of your test results from your hospital stay or ER visit. Additional Information If you have questions, please visit the Frequently Asked Questions section of the En[employer] website at [email or web address] Remember, [employer]use is NOT to be used for urgent needs. For medical emergencies, dial 911. Now available from your iPhone and Android! General Information Please provide this summary of care documentation to your next provider. Patient Signature:  ____________________________________________________________ Date:  ____________________________________________________________  
  
Kelsey Escobedo Provider Signature:  ____________________________________________________________ Date:  ____________________________________________________________

## 2017-03-13 NOTE — INTERVAL H&P NOTE
H&P Update:  Helena Bailey was seen and examined. History and physical has been reviewed. The patient has been examined.  There have been no significant clinical changes since the completion of the originally dated History and Physical.    Signed By: Mp Lozano MD     March 13, 2017 12:19 PM

## 2017-03-13 NOTE — PERIOP NOTES
1400  Patient received in PACU and connected to monitors. Vital signs stable. RN at bedside. Will continue to monitor. 1416  Rx for Percocet placed on chart. 0  Spoke to pt's wife in waiting room re pt status and plan of care. 1435  Pt encouraged to cough and deep breathe. Tolerating ice chips and belching occasionally. Report given to Shannan Iverson RN in Phase 2.

## 2017-03-30 ENCOUNTER — OFFICE VISIT (OUTPATIENT)
Dept: FAMILY MEDICINE CLINIC | Age: 47
End: 2017-03-30

## 2017-03-30 VITALS
WEIGHT: 187.4 LBS | OXYGEN SATURATION: 97 % | HEART RATE: 75 BPM | RESPIRATION RATE: 16 BRPM | HEIGHT: 67 IN | TEMPERATURE: 98.4 F | DIASTOLIC BLOOD PRESSURE: 78 MMHG | BODY MASS INDEX: 29.41 KG/M2 | SYSTOLIC BLOOD PRESSURE: 117 MMHG

## 2017-03-30 DIAGNOSIS — K58.0 IRRITABLE BOWEL SYNDROME WITH DIARRHEA: ICD-10-CM

## 2017-03-30 DIAGNOSIS — H93.13 TINNITUS, BILATERAL: ICD-10-CM

## 2017-03-30 DIAGNOSIS — F41.9 ANXIETY: Primary | ICD-10-CM

## 2017-03-30 RX ORDER — DICYCLOMINE HYDROCHLORIDE 20 MG/1
20 TABLET ORAL 2 TIMES DAILY
Qty: 60 TAB | Refills: 3 | Status: SHIPPED | OUTPATIENT
Start: 2017-03-30

## 2017-03-30 RX ORDER — ESCITALOPRAM OXALATE 10 MG/1
10 TABLET ORAL DAILY
Qty: 30 TAB | Refills: 3 | Status: SHIPPED | OUTPATIENT
Start: 2017-03-30 | End: 2017-08-07 | Stop reason: SDUPTHER

## 2017-03-30 NOTE — PROGRESS NOTES
1. Have you been to the ER, urgent care clinic since your last visit? Hospitalized since your last visit?hernia repair    2. Have you seen or consulted any other health care providers outside of the [address] [place] [address] since your last visit? Include any pap smears or colon screening.  No

## 2017-03-30 NOTE — PROGRESS NOTES
Caitlin Bland is a 40 y.o.  male and presents with     Chief Complaint   Patient presents with    Diarrhea    Anxiety    Back Pain    Fatty Liver    Ringing in Ear       Pt had umbilical hernia repair. Pt has chronic bac kpain for which he sees pain management. Pt had seen DR Nick Sagastume and Dr Bradly Ghosh who did not recommend any back surgery. Pt was getting shots in his back. Pt has ringing in his ears for long time. Itr gets worse when he is asleep. Pt takes Bentyl for diarrhea and it helps. Pt has stopped alcohol. Pt denies rectal bleeding or melena. Past Medical History:   Diagnosis Date    Asthma     Chronic pain     Other ill-defined conditions(799.89)     Internal Hemorrhoids     Past Surgical History:   Procedure Laterality Date    COLONOSCOPY N/A 7/21/2016    COLONOS[facility] performed by [facility]hemiah Gonzalez [facility] at [facility] ENDOSCOPY    HX ABDOMINAL LAPAROSCOPY      HX APPENDECTOMY      HX HERNIA REPAIR Left      Current Outpatient Prescriptions   Medication Sig    escitalopram oxalate (LEXAPRO) 10 mg tablet Take 1 Tab by mouth daily.  dicyclomine (BENTYL) 20 mg tablet Take 1 Tab by mouth two (2) times a day.  oxyCODONE-acetaminophen (PERCOCET) 5-325 mg per tablet Take 1-2 Tabs by mouth every four (4) hours as needed for Pain. Max Daily Amount: 12 Tabs.  eluxadoline (VIBERZI) 75 mg tablet Take 75 mg by mouth two (2) times daily (with meals).  albuterol (PROVENTIL HFA, VENTOLIN HFA, PROAIR HFA) 90 mcg/actuation inhaler Take 2 Puffs by inhalation every six (6) hours as needed for Wheezing.  meloxicam (MOBIC) 15 mg tablet Take 15 mg by mouth daily.  cyclobenzaprine (FLEXERIL) 5 mg tablet Take 5 mg by mouth as needed.  acetaminophen-codeine (TYLENOL #4) 300-60 mg per tablet Take 1 Tab by mouth every four (4) hours as needed for Pain.  triamcinolone acetonide (KENALOG) 0.1 % topical cream Apply  to affected area two (2) times a day.  use thin layer     No current facility-administered medications for this visit. Health Maintenance   Topic Date Due    Pneumococcal 19-64 Medium Risk (1 of 1 - PPSV23) 09/30/1990    DTaP/Tdap/Td series (2 - Td) 05/03/2026    INFLUENZA AGE 9 TO ADULT  Addressed     Immunization History   Administered Date(s) Administered    Influenza Vaccine (Quad) PF 10/04/2016    Tdap 05/03/2016     No LMP for male patient. Allergies and Intolerances: Allergies   Allergen Reactions    Tizanidine Other (comments)     Felt like he couldn't swallow    Tramadol Seizures       Family History:   Family History   Problem Relation Age of Onset    Emphysema Mother     Depression Father     Sleep Apnea Father        Social History:   He  reports that he quit smoking about 7 years ago. His smoking use included Cigarettes. He has a 33.00 pack-year smoking history.  He uses smokeless tobacco.  He  reports that he drinks about 8.4 oz of alcohol per week             Review of Systems:   General: negative for - chills, fatigue, fever, weight change  Psych: negative for - anxiety, depression, irritability or mood swings  ENT: negative for - headaches, hearing change, nasal congestion, oral lesions, sneezing or sore throat  Heme/ Lymph: negative for - bleeding problems, bruising, pallor or swollen lymph nodes  Endo: negative for - hot flashes, polydipsia/polyuria or temperature intolerance  Resp: negative for - cough, shortness of breath or wheezing  CV: negative for - chest pain, edema or palpitations  GI: negative for - abdominal pain, change in bowel habits, constipation, diarrhea or nausea/vomiting  : negative for - dysuria, hematuria, incontinence, pelvic pain or vulvar/vaginal symptoms  MSK: negative for - joint pain, joint swelling or muscle pain  Neuro: negative for - confusion, headaches, seizures or weakness  Derm: negative for - dry skin, hair changes, rash or skin lesion changes          Physical:   Vitals:   Vitals:    03/30/17 0858   BP: 117/78   Pulse: 75 Resp: 16   Temp: 98.4 °F (36.9 °C)   TempSrc: Oral   SpO2: 97%   Weight: 187 lb 6.4 oz (85 kg)   Height: 5' 7\" (1.702 m)           Exam:   HEENT- atraumatic,normocephalic, awake, oriented, well nourished  Neck - supple,no enlarged lymph nodes, no JVD, no thyromegaly  Chest- CTA, no rhonchi, no crackles  Heart- rrr, no murmurs / gallop/rub  Abdomen- soft,BS+,NT, no hepatosplenomegaly  Ext - no c/c/edema   Neuro- no focal deficits. Power 5/5 all extremities  Skin - warm,dry, no obvious rashes. Review of Data:   LABS:   Lab Results   Component Value Date/Time    WBC 8.5 08/28/2016 11:35 AM    HGB 15.4 08/28/2016 11:35 AM    HCT 44.3 08/28/2016 11:35 AM    PLATELET 373 18/63/3945 11:35 AM     Lab Results   Component Value Date/Time    Sodium 134 08/30/2016 09:46 AM    Potassium 3.6 08/30/2016 09:46 AM    Chloride 93 08/30/2016 09:46 AM    CO2 25 08/30/2016 09:46 AM    Glucose 103 08/30/2016 09:46 AM    BUN 7 08/30/2016 09:46 AM    Creatinine 0.9 08/30/2016 09:46 AM     Lab Results   Component Value Date/Time    Cholesterol, total 199 07/11/2016 02:43 PM    HDL Cholesterol 41 07/11/2016 02:43 PM    LDL, calculated 119 07/11/2016 02:43 PM    Triglyceride 198 07/11/2016 02:43 PM     No results found for: GPT        Impression / Plan:        ICD-10-CM ICD-9-CM    1. Anxiety F41.9 300.00 escitalopram oxalate (LEXAPRO) 10 mg tablet   2. Tinnitus, bilateral H93.13 388.30 REFERRAL TO ENT-OTOLARYNGOLOGY   3. Irritable bowel syndrome with diarrhea K58.0 564.1 dicyclomine (BENTYL) 20 mg tablet     Chronic back pain - follow up with Ortho and pain mangement. Elevated lFTs - asked pt to avoid drinking alcohol. Explained to patient risk benefits of the medications. Advised patient to stop meds if having any side effects. Pt verbalized understanding of the instructions. I have discussed the diagnosis with the patient and the intended plan as seen in the above orders.   The patient has received an after-visit summary and questions were answered concerning future plans. I have discussed medication side effects and warnings with the patient as well. I have reviewed the plan of care with the patient, accepted their input and they are in agreement with the treatment goals. Reviewed plan of care. Patient has provided input and agrees with goals.     Follow-up Disposition: Not on Marcy Cheema MD

## 2017-03-30 NOTE — MR AVS SNAPSHOT
Visit Information Date & Time Provider Department Dept. Phone Encounter #  
 3/30/2017  9:00 AM [id number] ANTHONY Adame, [address] [facility] [phone] Follow-up Instructions Return in about 4 months (around 7/30/2017). Your Appointments 4/6/2017  3:00 PM  
POST OP with Janet Aggarwal MD  
[address]) Appt Note: post op [address] [place] [address]  
  
   
 [address]quel [address]ar [address] [facility] [address] Upcoming Health Maintenance Date Due Pneumococcal 19-64 Medium Risk (1 of 1 - PPSV23) 9/30/1990 DTaP/Tdap/Td series (2 - Td) 5/3/2026 Allergies as of 3/30/2017  Review Complete On: 3/30/2017 By: [id number] ANTHONY Adame MD  
  
 Severity Noted Reaction Type Reactions Tizanidine  05/03/2016    Other (comments) Felt like he couldn't swallow Tramadol  03/10/2017    Seizures Current Immunizations  Never Reviewed Name Date Influenza Vaccine (Quad) PF 10/4/2016 10:50 AM  
 Tdap 5/3/2016 Not reviewed this visit You Were Diagnosed With   
  
 Codes Comments Anxiety    -  Primary ICD-10-CM: F41.9 ICD-9-CM: 300.00 Tinnitus, bilateral     ICD-10-CM: H93.13 
ICD-9-CM: 388.30 Irritable bowel syndrome with diarrhea     ICD-10-CM: K58.0 ICD-9-CM: 464.1 Vitals BP Pulse Temp Resp Height(growth percentile) Weight(growth percentile) 117/78 75 98.4 °F (36.9 °C) (Oral) 16 5' 7\" (1.702 m) 187 lb 6.4 oz (85 kg) SpO2 BMI Smoking Status 97% 29.35 kg/m2 Former Smoker Vitals History BMI and BSA Data Body Mass Index Body Surface Area  
 29.35 kg/m 2 2 m 2 Preferred Pharmacy Pharmacy Name Phone [facility] [address] [phone] Your Updated Medication List  
  
   
 This list is accurate as of: 3/30/17  9:32 AM.  Always use your most recent med list.  
  
  
  
  
 acetaminophen-codeine 300-60 mg per tablet Commonly known as:  TYLENOL #4 Take 1 Tab by mouth every four (4) hours as needed for Pain. albuterol 90 mcg/actuation inhaler Commonly known as:  PROVENTIL HFA, VENTOLIN HFA, PROAIR HFA Take 2 Puffs by inhalation every six (6) hours as needed for Wheezing. cyclobenzaprine 5 mg tablet Commonly known as:  FLEXERIL Take 5 mg by mouth as needed. dicyclomine 20 mg tablet Commonly known as:  BENTYL Take 1 Tab by mouth two (2) times a day. escitalopram oxalate 10 mg tablet Commonly known as:  Shay Wellington Take 1 Tab by mouth daily. meloxicam 15 mg tablet Commonly known as:  MOBIC Take 15 mg by mouth daily. oxyCODONE-acetaminophen 5-325 mg per tablet Commonly known as:  PERCOCET Take 1-2 Tabs by mouth every four (4) hours as needed for Pain. Max Daily Amount: 12 Tabs. triamcinolone acetonide 0.1 % topical cream  
Commonly known as:  KENALOG Apply  to affected area two (2) times a day. use thin layer VIBERZI 75 mg tablet Generic drug:  eluxadoline Take 75 mg by mouth two (2) times daily (with meals). Prescriptions Sent to Pharmacy Refills  
 escitalopram oxalate (LEXAPRO) 10 mg tablet 3 Sig: Take 1 Tab by mouth daily. Class: Normal  
 Pharmacy: [address] Ph #: [phone] Route: Oral  
 dicyclomine (BENTYL) 20 mg tablet 3 Sig: Take 1 Tab by mouth two (2) times a day. Class: Normal  
 Pharmacy: [address] Ph #: [phone] Route: Oral  
  
We Performed the Following REFERRAL TO ENT-OTOLARYNGOLOGY [HLR50 Custom] Follow-up Instructions Return in about 4 months (around 7/30/2017). Referral Information Referral ID Referred By Referred To  
  
 [id number] Alexei PARNELL Not Available Visits Status Start Date End Date 1 New Request 3/30/17 3/30/18 If your referral has a status of pending review or denied, additional information will be sent to support the outcome of this decision. Introducing [facility]! Dear Omar Mccoy: Thank you for requesting a [employer] account. Our records indicate that you already have an active [employer] account. You can access your account anytime at [email or web address] Did you know that you can access your hospital and ER discharge instructions at any time in [employer]? You can also review all of your test results from your hospital stay or ER visit. Additional Information If you have questions, please visit the Frequently Asked Questions section of the [employer] website at [email or web address] Remember, [employer] is NOT to be used for urgent needs. For medical emergencies, dial 911. Now available from your iPhone and Android! Please provide this summary of care documentation to your next provider. Your primary care clinician is listed as Melany Nettles. If you have any questions after today's visit, please call [phone].

## 2017-03-31 ENCOUNTER — TELEPHONE (OUTPATIENT)
Dept: FAMILY MEDICINE CLINIC | Age: 47
End: 2017-03-31

## 2017-03-31 NOTE — TELEPHONE ENCOUNTER
[employer] is calling stating that they have not received pts disability form that Dr. Carrie Donohue signed.  Please assist.

## 2017-04-04 ENCOUNTER — TELEPHONE (OUTPATIENT)
Dept: FAMILY MEDICINE CLINIC | Age: 47
End: 2017-04-04

## 2017-04-07 ENCOUNTER — OFFICE VISIT (OUTPATIENT)
Dept: SURGERY | Age: 47
End: 2017-04-07

## 2017-04-07 VITALS
TEMPERATURE: 96.9 F | WEIGHT: 186 LBS | HEIGHT: 67 IN | SYSTOLIC BLOOD PRESSURE: 117 MMHG | HEART RATE: 89 BPM | DIASTOLIC BLOOD PRESSURE: 79 MMHG | BODY MASS INDEX: 29.19 KG/M2

## 2017-04-07 DIAGNOSIS — Z09 POSTOPERATIVE EXAMINATION: Primary | ICD-10-CM

## 2017-04-07 NOTE — PROGRESS NOTES
Dwain Jade is a 40 y.o. male who presents today with   Chief Complaint   Patient presents with    Surgery     Umbilical Hernia repair 0/34/8076                1. Have you been to the ER, urgent care clinic since your last visit? Hospitalized since your last visit? No    2. Have you seen or consulted any other health care providers outside of the [address] [place] [address] since your last visit? Include any pap smears or colon screening.  No

## 2017-04-07 NOTE — PROGRESS NOTES
SUBJECTIVE: Bernice Allen is a 40 y.o. male is seen for a routine postop check after open umbilical hernia repair. Reports no problems with the wound or other issues. Activity, diet and bowels are normal. No pain. OBJECTIVE: Appears well. Wound is well healed without complications or infection. ASSESSMENT: normal postoperative course, doing well. PLAN: Resume normal activities. Return PRN.

## 2017-08-07 DIAGNOSIS — F41.9 ANXIETY: ICD-10-CM

## 2017-08-08 RX ORDER — ESCITALOPRAM OXALATE 10 MG/1
TABLET ORAL
Qty: 30 TAB | Refills: 0 | Status: SHIPPED | OUTPATIENT
Start: 2017-08-08

## (undated) DEVICE — KENDALL SCD EXPRESS SLEEVES, KNEE LENGTH, MEDIUM: Brand: KENDALL SCD

## (undated) DEVICE — HEX-LOCKING BLADE ELECTRODE: Brand: EDGE

## (undated) DEVICE — STERILE POLYISOPRENE POWDER-FREE SURGICAL GLOVES: Brand: PROTEXIS

## (undated) DEVICE — SUTURE MCRYL SZ 4-0 L27IN ABSRB UD L24MM PS-1 3/8 CIR PRIM Y935H

## (undated) DEVICE — SOLUTION IV 1000ML 0.9% SOD CHL

## (undated) DEVICE — STERILE POLYISOPRENE POWDER-FREE SURGICAL GLOVES WITH EMOLLIENT COATING: Brand: PROTEXIS

## (undated) DEVICE — SOLUTION SCRB 4OZ 4% CHG CLN BASE FOR PT SKIN ANTISEPSIS

## (undated) DEVICE — TRAY PREP DRY W/ PREM GLV 2 APPL 6 SPNG 2 UNDPD 1 OVERWRAP

## (undated) DEVICE — DERMABOND SKIN ADH 0.7ML -- DERMABOND ADVANCED 12/BX

## (undated) DEVICE — DEPAUL MAJOR PROCEDURE PACK: Brand: MEDLINE INDUSTRIES, INC.

## (undated) DEVICE — NEEDLE HYPO 22GA L1.5IN BLK S STL HUB POLYPR SHLD REG BVL

## (undated) DEVICE — BLADE ASSEMB CLP HAIR FINE --

## (undated) DEVICE — SUTURE VCRL SZ 3-0 L27IN ABSRB UD L26MM SH 1/2 CIR J416H

## (undated) DEVICE — APPLICATOR BALL COT MED LF NS --

## (undated) DEVICE — GOWN,SIRUS,FABRNF,XL,20/CS: Brand: MEDLINE

## (undated) DEVICE — REM POLYHESIVE ADULT PATIENT RETURN ELECTRODE: Brand: VALLEYLAB

## (undated) DEVICE — 3M™ TEGADERM™ CHG DRESSING 25/CARTON 4 CARTONS/CASE 1657: Brand: TEGADERM™

## (undated) DEVICE — (D)SYR 10ML 1/5ML GRAD NSAF -- PKGING CHANGE USE ITEM 338027

## (undated) DEVICE — LIGHT HANDLE: Brand: DEVON

## (undated) DEVICE — GLOVE SURG SZ 7.5 L11.73IN FNGR THK7.5MIL STRW LTX POLYMER

## (undated) DEVICE — 3M™ TEGADERM™ TRANSPARENT FILM DRESSING FRAME STYLE, 1626W, 4 IN X 4-3/4 IN (10 CM X 12 CM), 50/CT 4CT/CASE: Brand: 3M™ TEGADERM™

## (undated) DEVICE — SUTURE NRLN 0 L18IN NONABSORBABLE BLK MO-6 L26MM 1/2 CIR C545D